# Patient Record
Sex: FEMALE | Race: WHITE | NOT HISPANIC OR LATINO
[De-identification: names, ages, dates, MRNs, and addresses within clinical notes are randomized per-mention and may not be internally consistent; named-entity substitution may affect disease eponyms.]

---

## 2017-08-25 ENCOUNTER — TRANSCRIPTION ENCOUNTER (OUTPATIENT)
Age: 47
End: 2017-08-25

## 2018-10-10 ENCOUNTER — TRANSCRIPTION ENCOUNTER (OUTPATIENT)
Age: 48
End: 2018-10-10

## 2019-01-03 ENCOUNTER — TRANSCRIPTION ENCOUNTER (OUTPATIENT)
Age: 49
End: 2019-01-03

## 2019-03-20 ENCOUNTER — TRANSCRIPTION ENCOUNTER (OUTPATIENT)
Age: 49
End: 2019-03-20

## 2019-10-18 ENCOUNTER — TRANSCRIPTION ENCOUNTER (OUTPATIENT)
Age: 49
End: 2019-10-18

## 2020-11-05 ENCOUNTER — APPOINTMENT (OUTPATIENT)
Dept: OBGYN | Facility: CLINIC | Age: 50
End: 2020-11-05
Payer: COMMERCIAL

## 2020-11-05 VITALS
TEMPERATURE: 98.4 F | HEIGHT: 69 IN | DIASTOLIC BLOOD PRESSURE: 80 MMHG | BODY MASS INDEX: 23.11 KG/M2 | WEIGHT: 156 LBS | SYSTOLIC BLOOD PRESSURE: 140 MMHG

## 2020-11-05 DIAGNOSIS — Z78.9 OTHER SPECIFIED HEALTH STATUS: ICD-10-CM

## 2020-11-05 DIAGNOSIS — R92.2 INCONCLUSIVE MAMMOGRAM: ICD-10-CM

## 2020-11-05 PROCEDURE — 99072 ADDL SUPL MATRL&STAF TM PHE: CPT

## 2020-11-05 PROCEDURE — 99386 PREV VISIT NEW AGE 40-64: CPT

## 2020-11-05 NOTE — HISTORY OF PRESENT ILLNESS
[TextBox_4] : 50 year old postmenopausal  for annual exam. Concerned about pelvic discomfort she felt a few weeks ago where she had 3-4 days of feeling like she was ovulating. She is concerned because her mother and maternal grandmother passed away young (30s-40s) from ovarian cancer. She was previously counseled for genetic testing and declined at the time but is willing to reconsider. No postmenopausal bleeding. Has history of abnormal Paps that resolved. Sexually active without problems. She is a current smoker and not in a position to try quitting again at this time. She also reports consuming approx 12 alcoholic beverages/week. She works as a  for [employer] ([employer]).

## 2020-11-05 NOTE — PLAN
[FreeTextEntry1] : Pap done today. Rx mammogram and breast US provided. Referral for colonoscopy provided. \par Re: pelvic discomfort and family hx ovarian ca: will send for TVUS and genetics counseling. Pt to call after ultrasound to discuss results. \par Discussed smoking cessation and reducing alcohol intake. Pt amenable to reduce alcohol intake on her own and will contact us when she feels ready to try quitting smoking again.

## 2021-02-10 LAB
CYTOLOGY CVX/VAG DOC THIN PREP: NORMAL
HPV HIGH+LOW RISK DNA PNL CVX: NOT DETECTED

## 2022-09-26 ENCOUNTER — NON-APPOINTMENT (OUTPATIENT)
Age: 52
End: 2022-09-26

## 2023-01-21 ENCOUNTER — NON-APPOINTMENT (OUTPATIENT)
Age: 53
End: 2023-01-21

## 2023-01-21 ENCOUNTER — APPOINTMENT (OUTPATIENT)
Dept: INTERNAL MEDICINE | Facility: CLINIC | Age: 53
End: 2023-01-21
Payer: COMMERCIAL

## 2023-01-21 VITALS
OXYGEN SATURATION: 98 % | DIASTOLIC BLOOD PRESSURE: 90 MMHG | HEIGHT: 69 IN | SYSTOLIC BLOOD PRESSURE: 160 MMHG | BODY MASS INDEX: 22.66 KG/M2 | HEART RATE: 79 BPM | WEIGHT: 153 LBS

## 2023-01-21 DIAGNOSIS — Z83.49 FAMILY HISTORY OF OTHER ENDOCRINE, NUTRITIONAL AND METABOLIC DISEASES: ICD-10-CM

## 2023-01-21 DIAGNOSIS — Z13.71 ENCOUNTER FOR NONPROCREATIVE SCREENING FOR GENETIC DISEASE CARRIER STATUS: ICD-10-CM

## 2023-01-21 DIAGNOSIS — R03.0 ELEVATED BLOOD-PRESSURE READING, W/OUT DIAGNOSIS OF HYPERTENSION: ICD-10-CM

## 2023-01-21 DIAGNOSIS — Z82.49 FAMILY HISTORY OF ISCHEMIC HEART DISEASE AND OTHER DISEASES OF THE CIRCULATORY SYSTEM: ICD-10-CM

## 2023-01-21 DIAGNOSIS — Z12.31 ENCOUNTER FOR SCREENING MAMMOGRAM FOR MALIGNANT NEOPLASM OF BREAST: ICD-10-CM

## 2023-01-21 DIAGNOSIS — Z00.00 ENCOUNTER FOR GENERAL ADULT MEDICAL EXAMINATION W/OUT ABNORMAL FINDINGS: ICD-10-CM

## 2023-01-21 DIAGNOSIS — Z80.8 FAMILY HISTORY OF MALIGNANT NEOPLASM OF OTHER ORGANS OR SYSTEMS: ICD-10-CM

## 2023-01-21 DIAGNOSIS — F17.200 NICOTINE DEPENDENCE, UNSPECIFIED, UNCOMPLICATED: ICD-10-CM

## 2023-01-21 DIAGNOSIS — R10.2 PELVIC AND PERINEAL PAIN: ICD-10-CM

## 2023-01-21 DIAGNOSIS — Z80.41 FAMILY HISTORY OF MALIGNANT NEOPLASM OF OVARY: ICD-10-CM

## 2023-01-21 PROCEDURE — 99386 PREV VISIT NEW AGE 40-64: CPT | Mod: 25

## 2023-01-21 PROCEDURE — 36415 COLL VENOUS BLD VENIPUNCTURE: CPT

## 2023-01-21 NOTE — PLAN
[FreeTextEntry1] : offered pneumonia vaccine due to smoking hx - patient will call when ready to receive\par \par Mammo rx given\par Colonoscopy eval set up with Dr. Gomez today\par Referral to GYN for updated PAP - due [id number]\par Labs today\par Will refer to lung cancer screening program \par Not yet ready to quit, informed of smoking cessation program

## 2023-01-21 NOTE — HEALTH RISK ASSESSMENT
[Very Good] : ~his/her~  mood as very good [Current] : Current [Yes] : Yes [4 or more  times a week (4 pts)] : 4 or more  times a week (4 points) [3 or 4 (1 pt)] : 3 or 4  (1 point) [Never (0 pts)] : Never (0 points) [No] : In the past 12 months have you used drugs other than those required for medical reasons? No [No falls in past year] : Patient reported no falls in the past year [0] : 2) Feeling down, depressed, or hopeless: Not at all (0) [Patient reported mammogram was normal] : Patient reported mammogram was normal [Patient reported PAP Smear was normal] : Patient reported PAP Smear was normal [Patient reported colonoscopy was abnormal] : Patient reported colonoscopy was abnormal [HIV test declined] : HIV test declined [With Significant Other] : lives with significant other [Employed] : employed [Significant Other] : lives with significant other [Smoke Detector] : smoke detector [Carbon Monoxide Detector] : carbon monoxide detector [Seat Belt] :  uses seat belt [20 or more] : 20 or more [PHQ-2 Negative - No further assessment needed] : PHQ-2 Negative - No further assessment needed [Audit-CScore] : 5 [de-identified] : 1 pack a day- 34 yrs [de-identified] : walking once a week [de-identified] : regular [[id number]] : 0 [Change in mental status noted] : No change in mental status noted [Reports changes in hearing] : Reports no changes in hearing [Reports changes in vision] : Reports no changes in vision [Reports changes in dental health] : Reports no changes in dental health [Guns at Home] : no guns at home [Travel to Developing Areas] : does not  travel to developing areas [TB Exposure] : is not being exposed to tuberculosis [MammogramDate] : 01/12 [PapSmearDate] : 01/20 [ColonoscopyDate] : 01/02 [ColonoscopyComments] : 2 polyps [FreeTextEntry2] :  [de-identified] : Last exam 2019 [de-identified] : last exam 2019

## 2023-01-21 NOTE — HISTORY OF PRESENT ILLNESS
[de-identified] : 52F, current smoker 1ppd x 34 years (34py) presents today to establish care and for annual exam\par Feels well overall\par Has severe anxiety of getting COVID (improved now from beginning of pandemic, but extremely careful)\par Not currently exercising\par trying to eat healthy diet, but does eat more carbs than she would like sometimes

## 2023-01-23 LAB
ALBUMIN SERPL ELPH-MCNC: 5 G/DL
ALP BLD-CCNC: 93 U/L
ALT SERPL-CCNC: 23 U/L
ANION GAP SERPL CALC-SCNC: 14 MMOL/L
AST SERPL-CCNC: 28 U/L
BASOPHILS # BLD AUTO: 0.07 K/UL
BASOPHILS NFR BLD AUTO: 1.1 %
BILIRUB SERPL-MCNC: 0.4 MG/DL
BUN SERPL-MCNC: 12 MG/DL
CALCIUM SERPL-MCNC: 10.5 MG/DL
CHLORIDE SERPL-SCNC: 102 MMOL/L
CHOLEST SERPL-MCNC: 228 MG/DL
CO2 SERPL-SCNC: 26 MMOL/L
CREAT SERPL-MCNC: 0.82 MG/DL
EGFR: 85 ML/MIN/1.73M2
EOSINOPHIL # BLD AUTO: 0.12 K/UL
EOSINOPHIL NFR BLD AUTO: 1.8 %
ESTIMATED AVERAGE GLUCOSE: 103 MG/DL
GLUCOSE SERPL-MCNC: 102 MG/DL
HBA1C MFR BLD HPLC: 5.2 %
HCT VFR BLD CALC: 46.9 %
HDLC SERPL-MCNC: 109 MG/DL
HGB BLD-MCNC: 15.8 G/DL
IMM GRANULOCYTES NFR BLD AUTO: 0.2 %
LDLC SERPL CALC-MCNC: 97 MG/DL
LYMPHOCYTES # BLD AUTO: 1.25 K/UL
LYMPHOCYTES NFR BLD AUTO: 19.2 %
MAN DIFF?: NORMAL
MCHC RBC-ENTMCNC: 33.7 GM/DL
MCHC RBC-ENTMCNC: 34.1 PG
MCV RBC AUTO: 101.3 FL
MONOCYTES # BLD AUTO: 0.49 K/UL
MONOCYTES NFR BLD AUTO: 7.5 %
NEUTROPHILS # BLD AUTO: 4.57 K/UL
NEUTROPHILS NFR BLD AUTO: 70.2 %
NONHDLC SERPL-MCNC: 119 MG/DL
PLATELET # BLD AUTO: 240 K/UL
POTASSIUM SERPL-SCNC: 4.8 MMOL/L
PROT SERPL-MCNC: 7.4 G/DL
RBC # BLD: 4.63 M/UL
RBC # FLD: 12.8 %
SODIUM SERPL-SCNC: 141 MMOL/L
T4 FREE SERPL-MCNC: 1 NG/DL
TRIGL SERPL-MCNC: 110 MG/DL
TSH SERPL-ACNC: 1.26 UIU/ML
WBC # FLD AUTO: 6.51 K/UL

## 2023-02-06 ENCOUNTER — APPOINTMENT (OUTPATIENT)
Dept: THORACIC SURGERY | Facility: CLINIC | Age: 53
End: 2023-02-06
Payer: COMMERCIAL

## 2023-02-06 VITALS — HEIGHT: 69 IN | WEIGHT: 150 LBS | BODY MASS INDEX: 22.22 KG/M2

## 2023-02-06 PROCEDURE — G0296 VISIT TO DETERM LDCT ELIG: CPT

## 2023-02-06 NOTE — HISTORY OF PRESENT ILLNESS
[Current] : Current [TextBox_13] : Referred by Dr. Osorio\par \par TALISHA RHODES had telephonic visit for a review of eligibility and discussion of the Low dose CT lung cancer screening program. The following was reviewed and confirmed the patient meets screening eligibility criteria.\par -Age 52 year\par Smoking Status:\par -Current smoker\par -Number of pack(s) per day: 1 PPD \par -Number of years smoked: 35\par -Number of pack years smokin\par \par \par Ms. RHODES denies any signs or symptoms of lung cancer including new cough, changing cough, hemoptysis, and unintentional weight loss. \par \par Ms. AGUILAR denies any personal history of lung cancer. Reports no lung cancer in a 1st degree relative. Reports no lung cancer in a 2nd degree relative. Denies any history of lung disease. Denies any  history of  occupational exposures.  [PacksperYear] : 35

## 2023-02-06 NOTE — PLAN
[Smoking Cessation] : smoking cessation [FreeTextEntry1] : Plan:\par \par -Low dose CT chest for lung cancer screening.       [facility] has been CC'd in order for LDCT.\par \par -Follow up with patient and her referring provider after her LDCT results have been reviewed by the multidisciplinary clinical team, if needed.\par \par -Encouraged smoking cessation.\par \par Patient declines referral to CTC and CCX\par \par Should I screen? tool utilized. 6 Year risk of lung cancer is   1.2%. \par \par Patient wishes to proceed with screening.\par \par Engaged in discussion regarding risks of screening during Covid-19 pandemic and precautions that are being used  to reduce exposure.\par \par Engaged in shared decision making with Ms. RHODES . Answered all questions. She verbalized understanding and agreement. She knows to call back with and questions or concerns.\par

## 2023-02-06 NOTE — ASSESSMENT
[Declined] : Patient has declined cessation intervention [Pre-contemplation] : Pre-contemplation: The patient is not thinking about quitting smoking in the next 6 months [de-identified] : Is not ready to quit smoking at this time.

## 2023-02-18 ENCOUNTER — RESULT REVIEW (OUTPATIENT)
Age: 53
End: 2023-02-18

## 2023-02-23 ENCOUNTER — NON-APPOINTMENT (OUTPATIENT)
Age: 53
End: 2023-02-23

## 2023-03-08 ENCOUNTER — RESULT REVIEW (OUTPATIENT)
Age: 53
End: 2023-03-08

## 2023-03-08 ENCOUNTER — APPOINTMENT (OUTPATIENT)
Dept: GASTROENTEROLOGY | Facility: CLINIC | Age: 53
End: 2023-03-08
Payer: COMMERCIAL

## 2023-03-08 DIAGNOSIS — Z12.11 ENCOUNTER FOR SCREENING FOR MALIGNANT NEOPLASM OF COLON: ICD-10-CM

## 2023-03-08 PROCEDURE — 99203 OFFICE O/P NEW LOW 30 MIN: CPT

## 2023-03-08 NOTE — ASSESSMENT
[FreeTextEntry1] : Screening colonoscopy\par Risks (including but not limited to sedation risks, infection, bleeding, perforation, possibility of missed lesions), benefits and alternatives to procedure, including not doing the procedure, were discussed with patient. Patient understood and agreed to proceed with colonoscopy. \par Colonoscopy preparation instructions reviewed with patient.\par \par 2 Dulcolax two days prior to procedure + Split MiraLAX/Dulcolax preparation \par \par no holds \par

## 2023-03-08 NOTE — HISTORY OF PRESENT ILLNESS
[FreeTextEntry1] : 52 year F \par active tobacco use, mother with h/o ovarian ca dx age 32,  from [facility] age 36 presents for evaluation of colon cancer screening. She is seen at the request of Dr. Viry Osorio. \par \par She will see genetics-referred by PMD\par -scheduled for lung ca screening ct\par last gyn eval - scheduled for follow up. \par \par \par \par colonoscopy ~ 20 years ago, ?2 polyps, uncertain recall -possibly 10 years. \par \par Patient denies abdominal pain , n/v/heartburn, reflux, dysphagia/odynophagia, change in bowel habits, diarrhea, constipation, brbpr, melena. no weight loss.  Good appetite and energy level. Patient has daily BM, denies regular NSAID use. \par \par works from home\par \par

## 2023-03-08 NOTE — PHYSICAL EXAM
[Alert] : alert [Normal Voice/Communication] : normal voice/communication [Healthy Appearing] : healthy appearing [No Acute Distress] : no acute distress [Sclera] : the sclera and conjunctiva were normal [Hearing Threshold Finger Rub Not [place]] : hearing was normal [Normal Lips/Gums] : the lips and gums were normal [Oropharynx] : the oropharynx was normal [Normal Appearance] : the appearance of the neck was normal [No Neck Mass] : no neck mass was observed [No Respiratory Distress] : no respiratory distress [Normal PMI] : the apical impulse was abnormal [Bowel Sounds] : normal bowel sounds [Abdomen Tenderness] : non-tender [No Masses] : no abdominal mass palpated [Abdomen Soft] : soft [] : no hepatosplenomegaly [Abnormal Walk] : normal gait [Normal Color / Pigmentation] : normal skin color and pigmentation [Oriented To Time, Place, And Person] : oriented to person, place, and time [de-identified] : deferred

## 2023-03-17 ENCOUNTER — NON-APPOINTMENT (OUTPATIENT)
Age: 53
End: 2023-03-17

## 2023-03-17 ENCOUNTER — APPOINTMENT (OUTPATIENT)
Dept: PULMONOLOGY | Facility: CLINIC | Age: 53
End: 2023-03-17
Payer: COMMERCIAL

## 2023-03-17 ENCOUNTER — RESULT REVIEW (OUTPATIENT)
Age: 53
End: 2023-03-17

## 2023-03-17 VITALS
BODY MASS INDEX: 22.22 KG/M2 | DIASTOLIC BLOOD PRESSURE: 80 MMHG | HEIGHT: 69 IN | WEIGHT: 150 LBS | SYSTOLIC BLOOD PRESSURE: 130 MMHG | TEMPERATURE: 96.5 F | OXYGEN SATURATION: 98 % | HEART RATE: 72 BPM

## 2023-03-17 DIAGNOSIS — F17.210 NICOTINE DEPENDENCE, CIGARETTES, UNCOMPLICATED: ICD-10-CM

## 2023-03-17 PROCEDURE — 94010 BREATHING CAPACITY TEST: CPT

## 2023-03-17 PROCEDURE — 99204 OFFICE O/P NEW MOD 45 MIN: CPT | Mod: 25

## 2023-03-17 NOTE — ASSESSMENT
[FreeTextEntry1] :   Spirometry was performed is essentially normal with an FEV1 of 82% of predicted and FVC of 90% of predicted with a ratio of 71%.  This patient is a chronic heavy smoker.  She is appears motivated to stop smoking.  I have reassured her about the findings on the CAT scan.  I have advised her to give herself a quit date and follow-up with her primary care doctor.  She will obtain a low-dose CAT scan of the chest in 1 year and follow-up with me.  Thank you

## 2023-03-17 NOTE — HISTORY OF PRESENT ILLNESS
[FreeTextEntry1] : Evaluation after low-dose CT of the chest. [de-identified] :   This is a 52-year-old female smoker of 1 pack/day for 35 years.  She currently is smoking 10 to 12 cigarettes/day.  She denies significant cough, wheezing, shortness of breath with exertion.  She recently had a low-dose CAT scan of the chest and was referred for review.  I did review the scan and it reveals minimal scarring- linear atelectasis in the lingula and right middle lobe.  There is no significant emphysema.  Past medical history unremarkable.  Meds none.  Patient wants to stop smoking but she states she does not want to try medication.  She did try the patch previously and it caused nightmares.  She did not like how she felt on Wellbutrin.  She does not want to try Chantix.  She feels she will cut down on the amount she is smoking and have a quit date.  She states she is motivated to stop smoking.

## 2023-03-17 NOTE — PHYSICAL EXAM
[No Acute Distress] : no acute distress [Well Nourished] : well nourished [Well Developed] : well developed [Well-Appearing] : well-appearing [Normal Sclera/Conjunctiva] : normal sclera/conjunctiva [PERRL] : pupils equal round and reactive to light [Normal Outer Ear/Nose] : the outer ears and nose were normal in appearance [EOMI] : extraocular movements intact [Normal Oropharynx] : the oropharynx was normal [No JVD] : no jugular venous distention [No Lymphadenopathy] : no lymphadenopathy [Supple] : supple [Thyroid Normal, No Nodules] : the thyroid was normal and there were no nodules present [No Respiratory Distress] : no respiratory distress  [No Accessory Muscle Use] : no accessory muscle use [Clear to Auscultation] : lungs were clear to auscultation bilaterally [Normal Rate] : normal rate  [Regular Rhythm] : with a regular rhythm [Normal S1, S2] : normal S1 and S2 [No Murmur] : no murmur heard [No Carotid Bruits] : no carotid bruits [No Abdominal Bruit] : a ~M bruit was not heard ~T in the abdomen [No Varicosities] : no varicosities [Pedal Pulses Present] : the pedal pulses are present [No Edema] : there was no peripheral edema [No Palpable Aorta] : no palpable aorta [No Extremity Clubbing/Cyanosis] : no extremity clubbing/cyanosis [Soft] : abdomen soft [Non Tender] : non-tender [Non-distended] : non-distended [No Masses] : no abdominal mass palpated [No HSM] : no HSM [Normal Bowel Sounds] : normal bowel sounds [Normal Posterior Cervical Nodes] : no posterior cervical lymphadenopathy [Normal Anterior Cervical Nodes] : no anterior cervical lymphadenopathy [No Focal Deficits] : no focal deficits [Normal Gait] : normal gait [Normal Affect] : the affect was normal [Normal Insight/Judgement] : insight and judgment were intact

## 2023-03-22 ENCOUNTER — APPOINTMENT (OUTPATIENT)
Dept: HEMATOLOGY ONCOLOGY | Facility: CLINIC | Age: 53
End: 2023-03-22

## 2023-03-24 NOTE — DISCUSSION/SUMMARY
[FreeTextEntry1] : REASON FOR CONSULT\par Libby Jarrell is a 52-year-old female referred by Dr. Brigitte Cristobal for cancer genetic counseling and risk assessment due to a family history of cancer. Ms. Jarrell was seen on 3/21/2023 at which time medical and family history was ascertained and a pedigree constructed.\par \par RELEVANT MEDICAL HISTORY\par Ms. Jarrell is a healthy individual with no reported history of cancer. She has a family history of ovarian and colon cancer, see below.\par \par OTHER MEDICAL AND SURGICAL HISTORY:\par •	Medical History: None\par •	Surgical History: None\par \par OB/GYN HISTORY:\par Obstetrical History: \par Age at Menarche: 10\par Menopausal Status: Postmenopausal, age 48\par Age at First Live Birth: N/A\par Oral Contraceptive Use: pill use reported 15 years total\par Hormone Replacement Therapy: None\par \par CANCER SCREENING HISTORY:  \par Breast: \par •	Mammography: previous imaging reported in ,  most recent on 3/17/2023, mass in left medial posterior breast, targeted sonography recommended, asymmetry detected in the left lateral posterior breast\par •	Sonography: None\par •	MRI: None\par •	Biopsies: None\par GYN:\par •	Pelvic Examination: most recent exam on 2020\par o	Reported h/o abnormal pap smears with cervical scraping 2-3 times over past 20 years\par •	Sonography: 3/10/2023, myomatous uterus, normal endometrium, normal ovaries\par •	CA-125: None\par Colon:\par •	Colonoscopy: most recent reported 20 years ago \par o	Patient reported h/o 2 polyps\par •	Upper Endoscopy: None\par Skin:  \par •	FBSE: annual, most recent exam reported within past year\par •	Lesions biopsied/removed: benign lesion removed from toe\par \par SOCIAL HISTORY:\par •	Tobacco-product use: Current, h/o 1 pack/day for 35 years, currently smokes 10-12 cigarettes per day\par •	Environmental exposures: None\par \par FAMILY HISTORY:\par Maternal ancestry was reported as Polish, [place]an and paternal ancestry was reported as Burkinan, Cypriot. A detailed family history of cancer was ascertained, see below and scanned chart for pedigree. \par \par To her knowledge no one in the family has had germline testing for cancer susceptibility. Ashkenazi Shinto ancestry was denied. Consanguinity was denied. \par 	\par RISK ASSESSMENT:\par Ms. Jarrell’s family history is suggestive of a hereditary cancer syndrome given her mother’s ovarian cancer in her early 30s and possible colon cancer or colon metastasis in her late 30s ( age 37), her maternal grandmother’s ovarian cancer in her mid-30s, and her paternal aunt's metastatic melanoma in her early 40s ( in early 40s). The patient meets National Comprehensive Cancer Network (NCCN) criteria for genetic testing. We recommended genetic testing for genes associated with breast, gynecologic, colorectal, and melanoma cancers. This test analyzes 35 genes: APC, LUIS F, AXIN2, BAP1, BARD1, BMPR1A, BRCA1, BRCA2, BRIP1, CDH1, CDK4, CDKN2A, CHEK2, EPCAM, GREM1, MITF, MLH1, MSH2, MSH3, MSH6, MUTYH, NF1, NTHL1, PALB2, PMS2, POLD1, POLE, POT1, PTEN, RAD51C, RAD51D, RB1, SMAD4, STK11, TP53.\par \par The risks, benefits and limitations of genetic testing were discussed with Ms. Jarrell. In addition, we discussed the purpose of genetic testing and possible test results (positive, negative, inconclusive) along with associated medical management options and psychosocial implications. Insurance coverage and potential out of pocket costs were also discussed. The Genetic Information Non-discrimination Act (ARIN) was reviewed.\par \par It was explained that risk assessment is based upon medical and family history as provided and may change in the future should new information be obtained. \par \par Following our discussion, Ms. Jarrell consented to the above-mentioned genetic testing panel. Blood was drawn in our laboratory and sent to [facility] today.\par \par PLAN:\par \par 1.	Blood drawn today will be sent to [facility] for analysis. \par 2.	We will contact Ms. Jarrell to schedule a follow-up appointment once the results are available. Results generally return in 2-3 weeks. \par \par For any additional questions please call Cancer Genetics at [phone]. \par \par \par Kelley Whitney MS, [facility] [place] [facility]\par Genetic Counselor, Cancer Genetics\par \par \par CC: Brigitte Cristobal MD

## 2023-03-26 DIAGNOSIS — N63.20 UNSPECIFIED LUMP IN THE LEFT BREAST, UNSPECIFIED QUADRANT: ICD-10-CM

## 2023-03-29 ENCOUNTER — RESULT REVIEW (OUTPATIENT)
Age: 53
End: 2023-03-29

## 2023-04-07 ENCOUNTER — NON-APPOINTMENT (OUTPATIENT)
Age: 53
End: 2023-04-07

## 2023-04-07 NOTE — DISCUSSION/SUMMARY
[FreeTextEntry1] : Ms. Jarrell was contacted via telephone on 4/7/2023 and notified that she tested NEGATIVE for genes associated with breast, gynecologic, colorectal, and melanoma cancers offered by [facility]. Given her family history of ovarian cancer, we would like to have a follow-up appointment to discuss implications for Ms. Jarrell in the context of her personal history, family history, and negative genetic testing. Our scheduling team will reach out to set up that appointment. A copy of the report was sent for scanning.\par \par Kelley Whitney MS, [facility] [place]\par Genetic Counselor, Cancer Genetics

## 2023-04-28 ENCOUNTER — APPOINTMENT (OUTPATIENT)
Dept: INTERNAL MEDICINE | Facility: CLINIC | Age: 53
End: 2023-04-28
Payer: COMMERCIAL

## 2023-04-28 VITALS
HEIGHT: 69 IN | SYSTOLIC BLOOD PRESSURE: 130 MMHG | DIASTOLIC BLOOD PRESSURE: 82 MMHG | OXYGEN SATURATION: 98 % | BODY MASS INDEX: 23.7 KG/M2 | HEART RATE: 78 BPM | WEIGHT: 160 LBS

## 2023-04-28 DIAGNOSIS — R30.0 DYSURIA: ICD-10-CM

## 2023-04-28 LAB
BILIRUB UR QL STRIP: NORMAL
CLARITY UR: NORMAL
COLLECTION METHOD: NORMAL
GLUCOSE UR-MCNC: NORMAL
HCG UR QL: 0.2 EU/DL
HGB UR QL STRIP.AUTO: NORMAL
KETONES UR-MCNC: NORMAL
LEUKOCYTE ESTERASE UR QL STRIP: NORMAL
NITRITE UR QL STRIP: NORMAL
PH UR STRIP: 6
PROT UR STRIP-MCNC: NORMAL
SP GR UR STRIP: >=1.03

## 2023-04-28 PROCEDURE — 81003 URINALYSIS AUTO W/O SCOPE: CPT | Mod: QW

## 2023-04-28 PROCEDURE — 99213 OFFICE O/P EST LOW 20 MIN: CPT | Mod: 25

## 2023-04-28 RX ORDER — NITROFURANTOIN (MONOHYDRATE/MACROCRYSTALS) 25; 75 MG/1; MG/1
100 CAPSULE ORAL
Qty: 10 | Refills: 0 | Status: ACTIVE | COMMUNITY
Start: 2023-04-28 | End: 1900-01-01

## 2023-04-28 NOTE — HISTORY OF PRESENT ILLNESS
[FreeTextEntry8] : 52F who presents today with urinary symptoms\par Had fatigue yesterday, this am woke up with pelvic pressure\par Took at home urine test which was positive\par No frequency, hesitancy, or dysuria

## 2023-04-28 NOTE — PHYSICAL EXAM
[Normal Sclera/Conjunctiva] : normal sclera/conjunctiva [Normal Outer Ear/Nose] : the outer ears and nose were normal in appearance [No Edema] : there was no peripheral edema [Normal] : soft, non-tender, non-distended, no masses palpated, no HSM and normal bowel sounds [No CVA Tenderness] : no CVA  tenderness

## 2023-04-28 NOTE — HEALTH RISK ASSESSMENT
[Yes] : Yes [4 or more  times a week (4 pts)] : 4 or more  times a week (4 points) [3 or 4 (1 pt)] : 3 or 4  (1 point) [Never (0 pts)] : Never (0 points) [No] : In the past 12 months have you used drugs other than those required for medical reasons? No [No falls in past year] : Patient reported no falls in the past year [0] : 2) Feeling down, depressed, or hopeless: Not at all (0) [PHQ-2 Negative - No further assessment needed] : PHQ-2 Negative - No further assessment needed [Current] : Current [Audit-CScore] : 5 [de-identified] : walking once a week [ASR2Fcipn] : 0 [de-identified] : regular

## 2023-05-02 LAB — BACTERIA UR CULT: ABNORMAL

## 2023-06-01 ENCOUNTER — APPOINTMENT (OUTPATIENT)
Dept: HEMATOLOGY ONCOLOGY | Facility: CLINIC | Age: 53
End: 2023-06-01

## 2023-06-16 ENCOUNTER — OUTPATIENT (OUTPATIENT)
Dept: OUTPATIENT SERVICES | Facility: HOSPITAL | Age: 53
LOS: 1 days | Discharge: HOME | End: 2023-06-16

## 2023-06-16 DIAGNOSIS — Z15.89 GENETIC SUSCEPTIBILITY TO OTHER DISEASE: ICD-10-CM

## 2023-06-27 ENCOUNTER — APPOINTMENT (OUTPATIENT)
Dept: HEMATOLOGY ONCOLOGY | Facility: CLINIC | Age: 53
End: 2023-06-27
Payer: COMMERCIAL

## 2023-06-27 PROCEDURE — 99204 OFFICE O/P NEW MOD 45 MIN: CPT | Mod: 95

## 2023-07-18 NOTE — ASSESSMENT
[FreeTextEntry1] : The visit was provided via telehealth using real-time 2-way audio visual technology. The patient, Libby Jarrell, was located at home in [place], [place], at the time of the visit. The Genetic Counselor, Kelley Whitney MS, CGC was located at the medical office located in [place], [place]. The physician, Dr. Miguel Muñiz, was also located at the medical office in [place], [place] . The patient and both providers participated in the telehealth encounter. Genetic counseling student Bonita Flynn also participated in the session. Consent for telehealth services was given on 6/27/2023 by the patient, Libby Jarrell.\par \par REASON FOR CONSULT\par Ms. Jarrell is a 52-year-old female who was seen 6/27/2023 for a discussion regarding her genetic testing results related to hereditary cancer predisposition in the setting of her family history of cancer.\par \par Ms. Jarrell was originally seen at [facility] on 3/22/2023 for hereditary cancer predisposition risk assessment due to a family history of ovarian cancer. Ms. Jarrell decided to pursue genetic testing using [facility]’s Breast and Gynecologic Cancers Guidelines-Based Panel, Colorectal Cancer Guidelines-Based Panel, and Melanoma Cancers Panel. \par \par TEST RESULTS: NEGATIVE\par \par No pathogenic (disease-causing) variants or VUSs were detected in the following genes: APC, LUIS F, AXIN2, BAP1, BARD1, BMPR1A, BRCA1, BRCA2, BRIP1, CDH1, CDK4, CDKN2A, CHEK2, EPCAM, GREM1, MITF, MLH1, MSH2, MSH3, MSH6, MUTYH, NF1, NTHL1, PALB2, PMS2, POLD1, POLE, POT1, PTEN, RAD51C, RAD51D, RB1, SMAD4, STK11, TP53.\par \par RESULTS INTERPRETATION AND ASSESSMENT:\par Given Ms. Jarrell’s current reported family history of cancer, and her negative genetic test results, the following screening guidelines and risk-reducing recommendations were discussed:\par \par OVARIAN:\par •	Given Ms. Jarrell’s reported family history of ovarian cancer in her mother and maternal grandmother, both in their 30s, we discussed her current options for ovarian cancer risk-reduction. She tested negative for genes associated with ovarian cancer, and we reviewed that the family history as reported is somewhat unusual for hereditary breast and ovarian cancer syndrome related ovarian cancers, but that our assessment is very limited because of the information we have about these diagnoses. \par •	In the setting of Ms. Jarrell’s reported family history and her negative genetic testing, we explained that her remaining lifetime risk to develop ovarian cancer is likely near that of the general population. Despite this, a risk-reducing salpingo-oophorectomy may not be entirely unreasonable given that she lost her mother and grandmother at very young ages. Taking all of this into account, we would support RRSO if Ms. Jarrell decides to proceed with this approach. \par •	Additionally, we recommended that Ms. Jarrell’s brother consider genetic counseling and testing given the family history of ovarian cancer as this may have important implications for his own health and could also change our risk assessment for Ms. Jarrell’s management. \par \par COLON:\par •	We encouraged Ms. Jarrell to re-establish care with a gastroenterologist and undergo colonoscopy screening at least every 5 years given that her mother may have had primary colon cancer rather than ovarian cancer.\par \par OTHER: \par - In the absence of other indications, Ms. Jarrell should practice age-appropriate cancer screening of other organ systems as recommended for the general population.\par \par We also discussed that, while no cause of the patient’s personal and family history of cancer was identified, this result, while reassuring, does entirely not rule out a hereditary cancer risk in the patient. It is possible, although unlikely, the patient has a mutation in one of the genes tested that is not detectable by this analysis, or has a mutation in a different gene, either known or unknown. It is also possible there is a hereditary cancer predisposition in the family, but the patient did not inherit it.\par \par We informed Ms. Jarrell that our knowledge of genetics and inherited cancer conditions is changing rapidly. Therefore, we recommended that Ms. Jarrell contact our office, every 2 to 3 years, to discuss relevant advances in cancer genetics.  We emphasized the importance of re-contacting us with updates regarding her personal and family history of cancer as well as any updates regarding additional cancer genetic test results performed for the patient and/or family members.  Such updates could possibly change our risk assessment and recommendations. \par \par PLAN:\par 1.	See above for recommended screening and risk-reduction strategies.\par 2.	Patient informed consult note(s) will be available through their [facility] patient portal and genetic test results will be released via [facility]’s laboratory portal. \par 3.	Ms. Jarrell was encouraged to contact us every 2-3 years to discuss relevant advances in cancer genetics, or sooner if there are any changes in her personal or family history of cancer.\par \par For any additional questions please call Cancer Genetics at [phone]. \par \par \par Kelley Whitney MS, [facility] [place]\par Genetic Counselor, Cancer Genetics\par \par \par Attending Attestation:\par \par I have reviewed and edited the genetic counselor's note and I agree with the assessment and plan as documented. I spent approximately 45 minutes in total time of which approximately 25 minutes was face-to-face (via 2-way audiovisual telemedicine connection) with Ms. Jarrell reviewing her relevant personal and family history, the genetic testing results, our risk-assessment, and options for future cancer risk-reduction for the patient and her relevant family members. Over half this time was spent in counseling and coordination of care.\par \par Miguel Muñiz MD\par Chief, Cancer Genetics\par [facility] [place]\par \par \par CC: Brigitte Cristobal MD\par

## 2023-07-24 ENCOUNTER — NON-APPOINTMENT (OUTPATIENT)
Age: 53
End: 2023-07-24

## 2023-07-26 ENCOUNTER — RESULT REVIEW (OUTPATIENT)
Age: 53
End: 2023-07-26

## 2023-07-27 ENCOUNTER — APPOINTMENT (OUTPATIENT)
Dept: GASTROENTEROLOGY | Facility: HOSPITAL | Age: 53
End: 2023-07-27

## 2023-09-01 ENCOUNTER — APPOINTMENT (OUTPATIENT)
Dept: OBGYN | Facility: CLINIC | Age: 53
End: 2023-09-01

## 2023-10-17 ENCOUNTER — NON-APPOINTMENT (OUTPATIENT)
Age: 53
End: 2023-10-17

## 2024-02-03 ENCOUNTER — NON-APPOINTMENT (OUTPATIENT)
Age: 54
End: 2024-02-03

## 2024-03-12 ENCOUNTER — APPOINTMENT (OUTPATIENT)
Dept: OBGYN | Facility: CLINIC | Age: 54
End: 2024-03-12
Payer: COMMERCIAL

## 2024-03-12 VITALS
DIASTOLIC BLOOD PRESSURE: 80 MMHG | BODY MASS INDEX: 22.51 KG/M2 | HEIGHT: 69 IN | WEIGHT: 152 LBS | SYSTOLIC BLOOD PRESSURE: 130 MMHG

## 2024-03-12 DIAGNOSIS — Z01.419 ENCOUNTER FOR GYNECOLOGICAL EXAMINATION (GENERAL) (ROUTINE) W/OUT ABNORMAL FINDINGS: ICD-10-CM

## 2024-03-12 PROCEDURE — 99386 PREV VISIT NEW AGE 40-64: CPT

## 2024-03-12 NOTE — PHYSICAL EXAM
[Chaperone Declined] : Patient declined chaperone [Appropriately responsive] : appropriately responsive [Alert] : alert [No Acute Distress] : no acute distress [No Lymphadenopathy] : no lymphadenopathy [Soft] : soft [Non-tender] : non-tender [No HSM] : No HSM [Non-distended] : non-distended [No Lesions] : no lesions [Oriented x3] : oriented x3 [No Mass] : no mass [Examination Of The Breasts] : a normal appearance [No Masses] : no breast masses were palpable [Labia Minora] : normal [Labia Majora] : normal [Normal] : normal [Uterine Adnexae] : normal [FreeTextEntry6] : fibroid utx; nontender

## 2024-03-12 NOTE — PLAN
[FreeTextEntry1] : Reassured, most likely fibroid pain since they are exophytic and L sided and that's where her pain is.  For pelvic sono then to d/w me.

## 2024-03-12 NOTE — HISTORY OF PRESENT ILLNESS
[TextBox_4] : 54yo P0 here for annual gyn, new to me. She is  without any bleeding.  H/o fibroids and previously severe cramps requiring naproxen.  Currently gets some pain when she is more active/ walking around that feels like cramps but goes away when she rests.  Not persistent or severe. FH of ovarian/ colon CA. She has had neg genetic testing. Followed by GI for several polyps (15 on last colonoscopy). Current smoker s/ p  screening for Lung CA with CAT scan and visit with pulmonology.  She is in a monogamous relationship.  Works from home since Covid and may need to change jobs b/c they want her to commute into city again.

## 2024-03-13 LAB — HPV HIGH+LOW RISK DNA PNL CVX: NOT DETECTED

## 2024-03-15 LAB — CYTOLOGY CVX/VAG DOC THIN PREP: NORMAL

## 2024-03-29 ENCOUNTER — RESULT REVIEW (OUTPATIENT)
Age: 54
End: 2024-03-29

## 2024-04-26 ENCOUNTER — RESULT REVIEW (OUTPATIENT)
Age: 54
End: 2024-04-26

## 2024-05-22 ENCOUNTER — RESULT REVIEW (OUTPATIENT)
Age: 54
End: 2024-05-22

## 2024-05-23 ENCOUNTER — APPOINTMENT (OUTPATIENT)
Dept: GASTROENTEROLOGY | Facility: HOSPITAL | Age: 54
End: 2024-05-23

## 2024-06-03 ENCOUNTER — NON-APPOINTMENT (OUTPATIENT)
Age: 54
End: 2024-06-03